# Patient Record
Sex: MALE | Race: OTHER | HISPANIC OR LATINO | Employment: FULL TIME | URBAN - METROPOLITAN AREA
[De-identification: names, ages, dates, MRNs, and addresses within clinical notes are randomized per-mention and may not be internally consistent; named-entity substitution may affect disease eponyms.]

---
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
## 2019-03-21 ENCOUNTER — TELEPHONE (OUTPATIENT)
Dept: FAMILY MEDICINE CLINIC | Facility: CLINIC | Age: 19
End: 2019-03-21

## 2019-03-21 DIAGNOSIS — Z71.51 ENCOUNTER FOR DRUG REHABILITATION: Primary | ICD-10-CM

## 2019-03-21 NOTE — TELEPHONE ENCOUNTER
Patient would like a referral for a Drug Evaluation - Going to [email or web address] in [facility]  Please advise

## 2020-01-15 ENCOUNTER — HOSPITAL ENCOUNTER (EMERGENCY)
Facility: HOSPITAL | Age: 20
Discharge: HOME | End: 2020-01-15
Attending: EMERGENCY MEDICINE
Payer: COMMERCIAL

## 2020-01-15 ENCOUNTER — APPOINTMENT (EMERGENCY)
Dept: RADIOLOGY | Facility: HOSPITAL | Age: 20
End: 2020-01-15
Payer: COMMERCIAL

## 2020-01-15 VITALS
RESPIRATION RATE: 16 BRPM | HEART RATE: 76 BPM | TEMPERATURE: 98.7 F | SYSTOLIC BLOOD PRESSURE: 149 MMHG | OXYGEN SATURATION: 99 % | WEIGHT: 198.41 LBS | DIASTOLIC BLOOD PRESSURE: 80 MMHG | BODY MASS INDEX: 30.62 KG/M2

## 2020-01-15 DIAGNOSIS — S63.501A WRIST SPRAIN, RIGHT, INITIAL ENCOUNTER: Primary | ICD-10-CM

## 2020-01-15 PROCEDURE — 99283 EMERGENCY DEPT VISIT LOW MDM: CPT

## 2020-01-15 PROCEDURE — 73130 X-RAY EXAM OF HAND: CPT

## 2020-01-15 PROCEDURE — 73110 X-RAY EXAM OF WRIST: CPT

## 2020-01-15 PROCEDURE — 99283 EMERGENCY DEPT VISIT LOW MDM: CPT | Performed by: EMERGENCY MEDICINE

## 2020-01-15 NOTE — ED PROVIDER NOTES
History  Chief Complaint   Patient presents with    Hand Injury     Patient punched a wall last night  Injured right hand and wrist      20year old male presents to the emergency department for evaluation of right hand and wrist pain  Patient reports he punched a wall  Patient reports a history of injuring that hand before, requiring surgery to repair his 5th metacarpal   He denies any fever, numbness, tingling to the hand  He is right hand dominant  He is requesting a work note  History provided by:  Patient   used: No        Prior to Admission Medications   Prescriptions Last Dose Informant Patient Reported? Taking?   zolpidem (AMBIEN) 10 mg tablet   No No   Sig: Take 1 tablet (10 mg total) by mouth daily at bedtime as needed for sleep   Patient not taking: Reported on 11/14/2018       Facility-Administered Medications: None       History reviewed  No pertinent past medical history  History reviewed  No pertinent surgical history  Family History   Problem Relation Age of Onset    No Known Problems Mother     No Known Problems Father      I have reviewed and agree with the history as documented  Social History     Tobacco Use    Smoking status: Never Smoker    Smokeless tobacco: Never Used   Substance Use Topics    Alcohol use: Yes     Comment: socially     Drug use: No        Review of Systems   Constitutional: Negative for chills and fever  HENT: Negative for congestion and sore throat  Respiratory: Negative for cough and shortness of breath  Cardiovascular: Negative for chest pain  Gastrointestinal: Negative for abdominal pain, diarrhea, nausea and vomiting  Musculoskeletal: Positive for arthralgias  Negative for joint swelling, neck pain and neck stiffness  Skin: Negative for rash and wound  Neurological: Negative for headaches  All other systems reviewed and are negative        Physical Exam  Physical Exam   Constitutional: He is oriented to person, place, and time  Vital signs are normal  He appears well-developed and well-nourished  He does not appear ill  No distress  HENT:   Head: Normocephalic and atraumatic  Right Ear: Hearing and external ear normal    Left Ear: Hearing and external ear normal    Nose: Nose normal    Mouth/Throat: Mucous membranes are normal    Eyes: Conjunctivae are normal    Neck: Full passive range of motion without pain  Neck supple  Cardiovascular: Normal rate, regular rhythm, S1 normal, S2 normal and normal heart sounds  Pulmonary/Chest: Effort normal and breath sounds normal  He has no decreased breath sounds  He has no wheezes  Musculoskeletal:        Right wrist: He exhibits tenderness and bony tenderness  He exhibits normal range of motion, no swelling, no effusion, no crepitus, no deformity and no laceration  Arms:       Right hand: He exhibits tenderness and bony tenderness  He exhibits normal range of motion, normal two-point discrimination, normal capillary refill, no deformity, no laceration and no swelling  Normal sensation noted  Normal strength noted  Hands:  No snuffbox tenderness  Neurological: He is alert and oriented to person, place, and time  No cranial nerve deficit  GCS eye subscore is 4  GCS verbal subscore is 5  GCS motor subscore is 6  Skin: Skin is warm, dry and intact  Capillary refill takes less than 2 seconds  No rash noted  Nursing note and vitals reviewed        Vital Signs  ED Triage Vitals [01/15/20 0939]   Temperature Pulse Respirations Blood Pressure SpO2   98 7 °F (37 1 °C) 76 16 149/80 99 %      Temp Source Heart Rate Source Patient Position - Orthostatic VS BP Location FiO2 (%)   Temporal Monitor Lying Left arm --      Pain Score       3           Vitals:    01/15/20 0939   BP: 149/80   Pulse: 76   Patient Position - Orthostatic VS: Lying         Visual Acuity      ED Medications  Medications - No data to display    Diagnostic Studies  Results Reviewed None                 XR hand 3+ views RIGHT   ED Interpretation by Sheree Dsouza PA-C (01/15 1034)   Preliminary read currently by myself:  No acute osseous abnormality  Foreign body in soft tissue adjacent to 5th metacarpal      Final Result by Hermelinda Landau, MD (01/15 2358)   Probable foreign body dorsomedial to the head of the 5th metacarpal      No acute osseous abnormality  Workstation performed: [id number]         XR wrist 3+ views RIGHT   ED Interpretation by Sheree Dsouza PA-C (01/15 1034)   Preliminary read currently by myself:  No acute osseous abnormality  Foreign body seen adjacent to 5th metacarpal in soft tissue      Final Result by Hermelinda Landau, MD (01/15 8220)      No acute osseous abnormality  Findings are consistent with emergency provider's preliminary reading                     Workstation performed: [id number]                    Procedures  Splint application  Date/Time: 1/15/2020 10:36 AM  Performed by: Sheree Dsouza PA-C  Authorized by: Sheree Dsouza PA-C     Patient location:  Bedside  Performing Provider:  RN  Consent:     Consent obtained:  Verbal    Consent given by:  Patient    Risks discussed:  Discoloration, numbness, swelling and pain    Alternatives discussed:  No treatment, alternative treatment and referral  Universal protocol:     Patient identity confirmed:  Verbally with patient  Indication:     Indications: sprain/strain    Pre-procedure details:     Sensation:  Normal  Procedure details:     Laterality:  Right    Location:  Wrist    Wrist:  R wrist    Splint type:  Wrist    Supplies:  Prefabricated splint  Post-procedure details:     Pain:  Unchanged    Sensation:  Normal    Neurovascular Exam: skin pink, capillary refill <2 sec, normal pulses and skin intact, warm, and dry      Patient tolerance of procedure:   Tolerated well, no immediate complications             ED Course                               MDM  Number of Diagnoses or Management Options  Wrist sprain, right, initial encounter:   Diagnosis management comments: 20year old male presents with right wrist and hand pain after punching a wall  Xray negative for acute fracture  Retained FB adjacent to 5th metacarpal, likely from previous procedure  Will apply splint for comfort  Work note provided  Disposition  Final diagnoses:   Wrist sprain, right, initial encounter     Time reflects when diagnosis was documented in both MDM as applicable and the Disposition within this note     Time User Action Codes Description Comment    1/15/2020 10:36 AM Keturah Noyola [K76 800I] Wrist sprain, right, initial encounter       ED Disposition     ED Disposition Condition Date/Time Comment    Discharge Stable Wed Perry 15, 2020 10:36 AM Jeremy Ureña discharge to home/self care  Follow-up Information     Follow up With Specialties Details Why Contact Info Additional Gerardo De Guzman DO Family Medicine Schedule an appointment as soon as possible for a visit in 3 days  [address] [place] [address]   [phone]       [address] Orthopedic Surgery Go in 1 week If symptoms worsen [address] [facility]  [place] [address] [id number]  [address] [place] [address], [address] [facility], [address] [place] [address], [id number] [phone]          Discharge Medication List as of 1/15/2020 10:37 AM      CONTINUE these medications which have NOT CHANGED    Details   zolpidem (AMBIEN) 10 mg tablet Take 1 tablet (10 mg total) by mouth daily at bedtime as needed for sleep, Starting Wed 9/26/2018, Print           No discharge procedures on file      ED Provider  Electronically Signed by           Christopher Craig PA-C  01/16/20 8079

## 2020-01-15 NOTE — DISCHARGE INSTRUCTIONS
The xray was reviewed by me  I do not see evidence of a fracture, however the film will be reviewed by a radiologist   I informed the patient of this and if there is any discrepancy, the patient will be contacted

## 2020-01-20 ENCOUNTER — OFFICE VISIT (OUTPATIENT)
Dept: FAMILY MEDICINE CLINIC | Facility: CLINIC | Age: 20
End: 2020-01-20
Payer: COMMERCIAL

## 2020-01-20 VITALS
SYSTOLIC BLOOD PRESSURE: 130 MMHG | HEART RATE: 87 BPM | BODY MASS INDEX: 30.1 KG/M2 | TEMPERATURE: 101 F | DIASTOLIC BLOOD PRESSURE: 64 MMHG | WEIGHT: 198.6 LBS | OXYGEN SATURATION: 98 % | HEIGHT: 68 IN | RESPIRATION RATE: 18 BRPM

## 2020-01-20 DIAGNOSIS — S60.551A SUPERFICIAL FOREIGN BODY OF RIGHT HAND, INITIAL ENCOUNTER: Primary | ICD-10-CM

## 2020-01-20 DIAGNOSIS — S69.91XA INJURY OF RIGHT WRIST, INITIAL ENCOUNTER: ICD-10-CM

## 2020-01-20 DIAGNOSIS — S69.91XA INJURY OF RIGHT WRIST, INITIAL ENCOUNTER: Primary | ICD-10-CM

## 2020-01-20 DIAGNOSIS — S60.551A SUPERFICIAL FOREIGN BODY OF RIGHT HAND, INITIAL ENCOUNTER: ICD-10-CM

## 2020-01-20 PROCEDURE — 1036F TOBACCO NON-USER: CPT | Performed by: FAMILY MEDICINE

## 2020-01-20 PROCEDURE — 3008F BODY MASS INDEX DOCD: CPT | Performed by: FAMILY MEDICINE

## 2020-01-20 PROCEDURE — 99213 OFFICE O/P EST LOW 20 MIN: CPT | Performed by: FAMILY MEDICINE

## 2020-01-20 RX ORDER — ACETAMINOPHEN AND CODEINE PHOSPHATE 300; 30 MG/1; MG/1
TABLET ORAL
COMMUNITY
End: 2021-02-05

## 2020-01-20 NOTE — PROGRESS NOTES
Assessment/Plan:  Chief Complaint   Patient presents with    Follow-up     Pt presents for a f/u from the ER on 1/15/2020 due to R/wrist injury  Pt needs clearnace to return to work  Patient Instructions   Right wrist injury better and is much improved  No fracture on xray  He sprained his right wrist  He states he can go back to work full duty  Pt  Is medically cleared to go to fuull duty  The right wrist is much improved  He however has right hand foreign body (Probable foreign body dorsomedial to the head of the 5th metacarpal) and will [facility]  No problem-specific Assessment & Plan notes found for this encounter  Diagnoses and all orders for this visit:    Injury of right wrist, initial encounter    Superficial foreign body of right hand, initial encounter    Other orders  -     acetaminophen-codeine (TYLENOL/CODEINE #3) 300-30 MG per tablet; Tylenol-Codeine #3 300 mg-30 mg tablet   Take 1 tablet every 4-6 hours by oral route  Subjective:      Patient ID: Juan Ortega is a 20 y o  male  Follow-up (Pt presents for a f/u from the ER on 1/15/2020 due to R/wrist injury  Pt needs clearance to return to work  ) He punched a wall with his right hand and injured his right hand/wrist area  He injured his right hand in past by punching a wall in past and had surgery for this in past        The following portions of the patient's history were reviewed and updated as appropriate: allergies, current medications, past family history, past medical history, past social history, past surgical history and problem list     Review of Systems   Constitutional: Negative  HENT: Negative  Eyes: Negative  Respiratory: Negative  Cardiovascular: Negative  Gastrointestinal: Negative  Endocrine: Negative  Genitourinary: Negative      Musculoskeletal:        Right wrist injury getting better and is much improved and feels he can go back to work Skin: Negative  Allergic/Immunologic: Negative  Neurological: Negative  Hematological: Negative  Psychiatric/Behavioral: Negative  Objective:      /64 (BP Location: Left arm, Patient Position: Sitting, Cuff Size: Adult)   Pulse 87   Temp (!) 101 °F (38 3 °C) (Temporal)   Resp 18   Ht 5' 8" (1 727 m)   Wt 90 1 kg (198 lb 9 6 oz)   SpO2 98%   BMI 30 20 kg/m²          Physical Exam   Constitutional: He is oriented to person, place, and time  He appears well-developed and well-nourished  HENT:   Head: Normocephalic and atraumatic  Right Ear: External ear normal    Left Ear: External ear normal    Nose: Nose normal    Mouth/Throat: Oropharynx is clear and moist    Eyes: Pupils are equal, round, and reactive to light  Conjunctivae and EOM are normal    Neck: Normal range of motion  Neck supple  Cardiovascular: Normal rate, regular rhythm, normal heart sounds and intact distal pulses  Pulmonary/Chest: Effort normal and breath sounds normal    Musculoskeletal: Normal range of motion  Right wrist injury much improved    Neurological: He is alert and oriented to person, place, and time  He has normal reflexes  Skin: Skin is warm and dry  Psychiatric: He has a normal mood and affect   His behavior is normal

## 2020-01-20 NOTE — PROGRESS NOTES
BMI Counseling: Body mass index is 30 2 kg/m²  The BMI is above normal  Nutrition recommendations include reducing portion sizes, decreasing overall calorie intake, 3-5 servings of fruits/vegetables daily, reducing fast food intake, consuming healthier snacks and reducing intake of cholesterol  Exercise recommendations include exercising 3-5 times per week

## 2020-01-20 NOTE — PATIENT INSTRUCTIONS
Right wrist injury better and is much improved  No fracture on xray  He sprained his right wrist  He states he can go back to work full duty  Pt  Is medically cleared to go to fuull duty  The right wrist is much improved  He however has right hand foreign body (Probable foreign body dorsomedial to the head of the 5th metacarpal) and will need to f-up with hand specialist for this at [place] [address]  Consider anger management, he will call if interested in anger management with a counselor

## 2020-09-01 ENCOUNTER — OFFICE VISIT (OUTPATIENT)
Dept: FAMILY MEDICINE CLINIC | Facility: CLINIC | Age: 20
End: 2020-09-01
Payer: COMMERCIAL

## 2020-09-01 VITALS
TEMPERATURE: 97.7 F | HEART RATE: 84 BPM | WEIGHT: 213.4 LBS | SYSTOLIC BLOOD PRESSURE: 130 MMHG | BODY MASS INDEX: 31.61 KG/M2 | DIASTOLIC BLOOD PRESSURE: 82 MMHG | HEIGHT: 69 IN | OXYGEN SATURATION: 96 %

## 2020-09-01 DIAGNOSIS — E66.9 OBESITY (BMI 30-39.9): ICD-10-CM

## 2020-09-01 DIAGNOSIS — Z00.00 HEALTH CARE MAINTENANCE: Primary | ICD-10-CM

## 2020-09-01 PROCEDURE — 3725F SCREEN DEPRESSION PERFORMED: CPT | Performed by: FAMILY MEDICINE

## 2020-09-01 PROCEDURE — 99395 PREV VISIT EST AGE 18-39: CPT | Performed by: FAMILY MEDICINE

## 2020-09-01 PROCEDURE — 1036F TOBACCO NON-USER: CPT | Performed by: FAMILY MEDICINE

## 2020-09-01 NOTE — PATIENT INSTRUCTIONS
Here for general PE and rec losing weight, gave 1500 calorie low sugar diet  Recheck in 1 year  Rec following CDC guidelines for prevention of covid 19  Monitor for ticks this summer

## 2020-09-01 NOTE — PROGRESS NOTES
Assessment/Plan:  Chief Complaint   Patient presents with    Physical Exam     Here for yearly physical exam      Patient Instructions   Here for general PE and rec losing weight, gave 1500 calorie low sugar diet  Recheck in 1 year  Rec following CDC guidelines for prevention of covid 19  Monitor for ticks this summer  No problem-specific Assessment & Plan notes found for this encounter  Diagnoses and all orders for this visit:    Health care maintenance    Obesity (BMI 30-39  9)          Subjective:      Patient ID: Marybel Sarkar is a 20 y o  male  Physical Exam (Here for yearly physical exam ) exercising now but gained weight about 15 pounds  The following portions of the patient's history were reviewed and updated as appropriate: allergies, current medications, past family history, past medical history, past social history, past surgical history and problem list     Review of Systems   Constitutional:        Obesity   HENT: Negative  Eyes: Negative  Respiratory: Negative  Cardiovascular: Negative  Gastrointestinal: Negative  Endocrine: Negative  Genitourinary: Negative  Musculoskeletal: Negative  Skin: Negative  Allergic/Immunologic: Negative  Neurological: Negative  Hematological: Negative  Psychiatric/Behavioral: Negative  Objective:      /82   Pulse 84   Temp 97 7 °F (36 5 °C) (Temporal)   Ht 5' 8 5" (1 74 m)   Wt 96 8 kg (213 lb 6 4 oz)   SpO2 96%   BMI 31 98 kg/m²          Physical Exam  Constitutional:       Appearance: He is well-developed  Comments: obesity   HENT:      Head: Normocephalic and atraumatic  Right Ear: External ear normal       Left Ear: External ear normal       Nose: Nose normal    Eyes:      Conjunctiva/sclera: Conjunctivae normal       Pupils: Pupils are equal, round, and reactive to light  Neck:      Musculoskeletal: Normal range of motion and neck supple     Cardiovascular:      Rate and Rhythm: Normal rate and regular rhythm  Heart sounds: Normal heart sounds  Pulmonary:      Effort: Pulmonary effort is normal       Breath sounds: Normal breath sounds  Abdominal:      General: Abdomen is flat  Bowel sounds are normal       Palpations: Abdomen is soft  Genitourinary:     Penis: Normal     Musculoskeletal: Normal range of motion  Skin:     General: Skin is warm and dry  Neurological:      Mental Status: He is alert and oriented to person, place, and time  Deep Tendon Reflexes: Reflexes are normal and symmetric     Psychiatric:         Behavior: Behavior normal

## 2021-02-05 ENCOUNTER — APPOINTMENT (EMERGENCY)
Dept: RADIOLOGY | Facility: HOSPITAL | Age: 21
End: 2021-02-05
Payer: COMMERCIAL

## 2021-02-05 ENCOUNTER — HOSPITAL ENCOUNTER (EMERGENCY)
Facility: HOSPITAL | Age: 21
Discharge: HOME | End: 2021-02-05
Attending: EMERGENCY MEDICINE | Admitting: EMERGENCY MEDICINE
Payer: COMMERCIAL

## 2021-02-05 VITALS
SYSTOLIC BLOOD PRESSURE: 152 MMHG | OXYGEN SATURATION: 99 % | HEART RATE: 87 BPM | RESPIRATION RATE: 16 BRPM | DIASTOLIC BLOOD PRESSURE: 71 MMHG | TEMPERATURE: 98.5 F

## 2021-02-05 DIAGNOSIS — S61.421A LACERATION OF RIGHT HAND WITH FOREIGN BODY, INITIAL ENCOUNTER: Primary | ICD-10-CM

## 2021-02-05 PROCEDURE — 12041 INTMD RPR N-HF/GENIT 2.5CM/<: CPT | Performed by: EMERGENCY MEDICINE

## 2021-02-05 PROCEDURE — 99283 EMERGENCY DEPT VISIT LOW MDM: CPT

## 2021-02-05 PROCEDURE — 99285 EMERGENCY DEPT VISIT HI MDM: CPT | Performed by: EMERGENCY MEDICINE

## 2021-02-05 PROCEDURE — 73130 X-RAY EXAM OF HAND: CPT

## 2021-02-05 RX ORDER — CEPHALEXIN 500 MG/1
500 CAPSULE ORAL EVERY 6 HOURS SCHEDULED
Qty: 28 CAPSULE | Refills: 0 | Status: SHIPPED | OUTPATIENT
Start: 2021-02-05 | End: 2021-02-12

## 2021-02-05 RX ORDER — LIDOCAINE HYDROCHLORIDE 10 MG/ML
10 INJECTION, SOLUTION EPIDURAL; INFILTRATION; INTRACAUDAL; PERINEURAL ONCE
Status: COMPLETED | OUTPATIENT
Start: 2021-02-05 | End: 2021-02-05

## 2021-02-05 RX ADMIN — LIDOCAINE HYDROCHLORIDE 10 ML: 10 INJECTION, SOLUTION EPIDURAL; INFILTRATION; INTRACAUDAL; PERINEURAL at 11:27

## 2021-02-05 NOTE — ED PROVIDER NOTES
History  Chief Complaint   Patient presents with    Laceration     Pt presents to ER with several lacerations to right hand fingers  Pt reports punching glass door  Pt able to move fingers  Dressing applied to control bleeding  [place] tetanus status  Bernadette Expose a 22 y/o male presents for evaluation of right hand (digits 1 and 4) laceration that occurred "30 minutes ago"  Patient states he was arguing with his girl friend this morning and punched glass door  He states he feels there is small piece of glass in his right thumb  He reports he had tetanus booster 6 months ago when he suffered right pinky digit laceration while playing basketball  He also underwent a surgery of right pinky digit laceration 6 months ago  Denies any other injuries  Denies n/v/f/sob and chest pain  None       No past medical history on file  No past surgical history on file  Family History   Problem Relation Age of Onset    No Known Problems Mother     No Known Problems Father      I have reviewed and agree with the history as documented  E-Cigarette/Vaping    E-Cigarette Use Current Some Day User      E-Cigarette/Vaping Substances    Nicotine Yes     Flavoring Yes      Social History     Tobacco Use    Smoking status: Never Smoker    Smokeless tobacco: Never Used   Substance Use Topics    Alcohol use: Yes     Comment: socially     Drug use: Yes     Frequency: 4 0 times per week     Types: Marijuana        Review of Systems   Constitutional: Negative for chills, fatigue and fever  HENT: Negative  Eyes: Negative  Respiratory: Negative for chest tightness and shortness of breath  Cardiovascular: Negative for chest pain and leg swelling  Gastrointestinal: Negative for diarrhea, nausea and vomiting  Genitourinary: Negative  Musculoskeletal: Negative  Skin: Positive for color change and wound  Neurological: Negative  Psychiatric/Behavioral: Negative          Physical Exam  ED Triage Vitals [02/05/21 1044]   Temperature Pulse Respirations Blood Pressure SpO2   98 5 °F (36 9 °C) 87 16 152/71 99 %      Temp Source Heart Rate Source Patient Position - Orthostatic VS BP Location FiO2 (%)   Oral Monitor Sitting Right arm --      Pain Score       1             Orthostatic Vital Signs  Vitals:    02/05/21 1044   BP: 152/71   Pulse: 87   Patient Position - Orthostatic VS: Sitting       Physical Exam  Constitutional:       Appearance: Normal appearance  HENT:      Head: Normocephalic and atraumatic  Nose: Nose normal    Neck:      Musculoskeletal: Normal range of motion and neck supple  Cardiovascular:      Rate and Rhythm: Normal rate  Pulmonary:      Effort: Pulmonary effort is normal       Breath sounds: Normal breath sounds  Musculoskeletal:         General: Swelling and tenderness present  Hands:       Comments: Intact ROM to the right 1st and 4th digits  Mild pain upon palpation  Laceration appears to be at the level of interphalangeal joints  HPL tendon is intact as patient is able to move his thumb  Tenderness around the lacerated area on both digits  Skin:     General: Skin is cool  Findings: Erythema present  Comments: Right hand 1st and 4th digits laceration without tendon or bone involvement  No active bleeding noted  Dry blood noted at the level of lacerations  Laceration did not probe to tendon, capsule, or bone for both digits on the right hand  1st digit the laceration is about 1cm and 4th digit laceration measures 0 5cm  Lacerations are to the level of subcutaneous tissue  Neurological:      Mental Status: He is alert  Psychiatric:         Mood and Affect: Mood normal          ED Medications  Medications   lidocaine (PF) (XYLOCAINE-MPF) 1 % injection 10 mL (10 mL Infiltration Given 2/5/21 1127)       Diagnostic Studies  Results Reviewed     None                 XR hand 3+ views RIGHT   Final Result by Cesar Springer DO (02/05 1156)      No fracture  Tiny punctate foreign bodies in the 2nd and 5th digits as above  The study was marked in [facility] for immediate notification  Workstation performed: [id number]               Procedures  Laceration repair    Date/Time: 2/5/2021 2:33 PM  Performed by: Deborah Hutchinson DPM  Authorized by: Deborah Hutchinson DPM   Consent: Verbal consent obtained  Risks and benefits: risks, benefits and alternatives were discussed  Consent given by: patient  Patient understanding: patient states understanding of the procedure being performed  Patient identity confirmed: verbally with patient  Body area: upper extremity (Right Thumb and Ring finger)  Laceration length: 0 5 cm  Foreign bodies: glass  Tendon involvement: none  Nerve involvement: none  Vascular damage: no    Anesthesia:  Local Anesthetic: lidocaine 1% without epinephrine  Anesthetic total: 6 mL    Sedation:  Patient sedated: no      Wound Dehiscence:  Superficial Wound Dehiscence: simple closure      Procedure Details:  Preparation: Patient was prepped and draped in the usual sterile fashion  Irrigation solution: saline  Irrigation method: syringe  Amount of cleaning: standard  Debridement: none  Degree of undermining: minimal  Skin closure: 4-0 nylon  Approximation: close  Dressing: 4x4 sterile gauze and tube gauze (adaptic)  Patient tolerance: patient tolerated the procedure well with no immediate complications  Comments: One piece of glass was removed from the thumb, another piece was felt with forceps however to remove thus left it in there  There was no foreign body noted in the right ring finger  Cleaning details: glass          ED Course  ED Course as of Feb 05 1427 Fri Feb 05, 2021   1203 Dressings were taken off to assess the laceration on the right 1st and 4th digits, no active bleeding noted  There were every small thin glass pieces noted on the skin of 2nd and 5th digits, which were taken off  Ordered Xray         1248 X ray read by me: couple small pieces of foreign body noted on the right thumb area, does not appear to be deep or near the joint  Lidocaine plain injected to numb the 1st and 4th digits of right hand to explore the lacerated area  One small piece of glass was removed  200 Dr Samy Horn was paged for recommendations, unable to remove another piece of glass from the right thumb  Sent clinical pictures and xray, Dr Samy Horn recommended to suture the laceration as long as EPL tendon was intact and laceration did not go into the joint  Patient and girlfriend continues to argue while I was exploring the laceration to remove the foreign body  Patient states he is "frustrated" and wants me to stop exploring the laceration  I tiger texted Dr Samy Horn, he recommended to clean the wound well and close with sutures and follow up in 1-2 weeks  Patients girlfriend is yelling in the room and states she is calling her aunt who is  about this situation as patient is frustrated and worried about leaving the piece of glass in the wound  Patient and his girlfriend were raising their voices at me stating why isn't hand specialist coming down to see the patient  I explained them in details that I discussed the case with Dr Samy Horn and recommended to close the laceration  SBIRT 22yo+      Most Recent Value   SBIRT (24 yo +)   In order to provide better care to our patients, we are screening all of our patients for alcohol and drug use  Would it be okay to ask you these screening questions?   No Filed at: 02/05/2021 1131                MDM  Number of Diagnoses or Management Options  Laceration of right hand with foreign body, initial encounter: new and requires workup  Risk of Complications, Morbidity, and/or Mortality  Presenting problems: moderate  Management options: moderate    Patient Progress  Patient progress: stable      Disposition  Final diagnoses:   Laceration of right hand with foreign body, initial encounter     Time reflects when diagnosis was documented in both MDM as applicable and the Disposition within this note     Time User Action Codes Description Comment    2/5/2021  1:45 PM Khoa Suh Add [I45 [id number]] Laceration of right hand with foreign body, initial encounter       ED Disposition     ED Disposition Condition Date/Time Comment    Discharge Stable Fri Feb 5, 2021  1:45 PM Garrick Sorto discharge to home/self care  Follow-up Information     Follow up With Specialties Details Why Contact Info    Benancio Lanes, MD Orthopedic Surgery, Hand Surgery In 1 week For suture removal [place]  [phone]            Discharge Medication List as of 2/5/2021  1:49 PM      START taking these medications    Details   cephalexin (KEFLEX) 500 mg capsule Take 1 capsule (500 mg total) by mouth every 6 (six) hours for 7 days, Starting Fri 2/5/2021, Until Fri 2/12/2021, Normal           No discharge procedures on file  PDMP Review     None           ED Provider  Attending physically available and evaluated Garrick Sorto  I managed the patient along with the ED Attending      Electronically Signed by         Vijay Ding DPM  02/05/21 4686
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
